# Patient Record
Sex: MALE | Race: WHITE
[De-identification: names, ages, dates, MRNs, and addresses within clinical notes are randomized per-mention and may not be internally consistent; named-entity substitution may affect disease eponyms.]

---

## 2020-07-15 ENCOUNTER — HOSPITAL ENCOUNTER (EMERGENCY)
Dept: HOSPITAL 74 - JER | Age: 25
LOS: 1 days | Discharge: HOME | End: 2020-07-16
Payer: COMMERCIAL

## 2020-07-15 VITALS — BODY MASS INDEX: 28.6 KG/M2

## 2020-07-15 DIAGNOSIS — R10.9: Primary | ICD-10-CM

## 2020-07-15 DIAGNOSIS — N50.812: ICD-10-CM

## 2020-07-15 LAB
ALBUMIN SERPL-MCNC: 4.6 G/DL (ref 3.4–5)
ALP SERPL-CCNC: 93 U/L (ref 45–117)
ALT SERPL-CCNC: 35 U/L (ref 13–61)
ANION GAP SERPL CALC-SCNC: 7 MMOL/L (ref 8–16)
APPEARANCE UR: CLEAR
AST SERPL-CCNC: 26 U/L (ref 15–37)
BASOPHILS # BLD: 0.4 % (ref 0–2)
BILIRUB SERPL-MCNC: 0.3 MG/DL (ref 0.2–1)
BILIRUB UR STRIP.AUTO-MCNC: NEGATIVE MG/DL
BUN SERPL-MCNC: 20.9 MG/DL (ref 7–18)
CALCIUM SERPL-MCNC: 9.2 MG/DL (ref 8.5–10.1)
CHLORIDE SERPL-SCNC: 108 MMOL/L (ref 98–107)
CO2 SERPL-SCNC: 25 MMOL/L (ref 21–32)
COLOR UR: YELLOW
CREAT SERPL-MCNC: 0.9 MG/DL (ref 0.55–1.3)
DEPRECATED RDW RBC AUTO: 13.3 % (ref 11.9–15.9)
EOSINOPHIL # BLD: 2.4 % (ref 0–4.5)
GLUCOSE SERPL-MCNC: 94 MG/DL (ref 74–106)
HCT VFR BLD CALC: 46.8 % (ref 35.4–49)
HGB BLD-MCNC: 15.8 GM/DL (ref 11.7–16.9)
KETONES UR QL STRIP: NEGATIVE
LEUKOCYTE ESTERASE UR QL STRIP.AUTO: NEGATIVE
LIPASE SERPL-CCNC: 161 U/L (ref 73–393)
LYMPHOCYTES # BLD: 38.3 % (ref 8–40)
MCH RBC QN AUTO: 29.6 PG (ref 25.7–33.7)
MCHC RBC AUTO-ENTMCNC: 33.7 G/DL (ref 32–35.9)
MCV RBC: 87.6 FL (ref 80–96)
MONOCYTES # BLD AUTO: 7.2 % (ref 3.8–10.2)
NEUTROPHILS # BLD: 51.7 % (ref 42.8–82.8)
NITRITE UR QL STRIP: NEGATIVE
PH UR: 6.5 [PH] (ref 5–8)
PLATELET # BLD AUTO: 216 K/MM3 (ref 134–434)
PMV BLD: 8.5 FL (ref 7.5–11.1)
POTASSIUM SERPLBLD-SCNC: 4.2 MMOL/L (ref 3.5–5.1)
PROT SERPL-MCNC: 7.7 G/DL (ref 6.4–8.2)
PROT UR QL STRIP: NEGATIVE
PROT UR QL STRIP: NEGATIVE
RBC # BLD AUTO: 5.35 M/MM3 (ref 4–5.6)
SODIUM SERPL-SCNC: 140 MMOL/L (ref 136–145)
SP GR UR: 1.03 (ref 1.01–1.03)
UROBILINOGEN UR STRIP-MCNC: 1 MG/DL (ref 0.2–1)
WBC # BLD AUTO: 10.5 K/MM3 (ref 4–10)

## 2020-07-15 PROCEDURE — 3E033GC INTRODUCTION OF OTHER THERAPEUTIC SUBSTANCE INTO PERIPHERAL VEIN, PERCUTANEOUS APPROACH: ICD-10-PCS

## 2020-07-15 PROCEDURE — 3E0333Z INTRODUCTION OF ANTI-INFLAMMATORY INTO PERIPHERAL VEIN, PERCUTANEOUS APPROACH: ICD-10-PCS

## 2020-07-15 NOTE — PDOC
History of Present Illness





- General


Chief Complaint: Pain, Acute


Stated Complaint: STOMACH PAIN


Time Seen by Provider: 07/15/20 20:18


History Source: Patient


Exam Limitations: No Limitations





- History of Present Illness


Initial Comments: 





07/15/20 20:59


25M w/o PMH p/w moderate sharp constant left flank and LLQ pain with associated 

left testicular pain x1 day. no f/c, n/v, JILLIAN. has had similar episodes in the 

past w/o definitive dx. denies cough, sob. +pleuritic cp only on deep 

inspiration. sexually active in monogamous relationship. NKDA. Denies etoh, 

tobacco, substances. 








Past History





- Medical History


Allergies/Adverse Reactions: 


                                    Allergies











Allergy/AdvReac Type Severity Reaction Status Date / Time


 


No Known Allergies Allergy   Verified 07/15/20 20:24











Home Medications: 


Ambulatory Orders





Acetaminophen [Tylenol .Extra-Strength -] 500 mg PO AM 11/02/15 


Ibuprofen [Motrin -] 600 mg PO QID #28 tablet 11/02/15 


Cyclobenzaprine HCl [Flexeril 10 mg] 10 mg PO Q8H PRN #15 tablet 10/11/17 


Ibuprofen 600 mg PO Q6H PRN #20 tablet 10/11/17 











- Immunization History


Immunization Up to Date: No





- Psycho-Social/Smoking History


Smoking History: Never smoked


Have you smoked in the past 12 months: No





- Substance Abuse Hx (Audit-C & DAST Scrn)


How often the patient has a drink containing alcohol: Never


Score: In Men: 4 or > Positive; In Women: 3 or > Positive: 0


Screen Result (Pos requires Nsg. Audit-10AR): Negative





**Review of Systems





- Review of Systems


Comments:: 





07/16/20 06:05


CONSTITUTIONAL: Denies F / C


HEENT: Denies headache, lightheadedness, dizziness, changes in vision / hearing,

diplopia, blurry vision, sore throat, rhinorrhea


RESP: Denies SOB, cough, orthopnea, CARRION


CARD: +pleuritic / deep inspiratory chest pain


GI: + left flank and LLQ pain. Denies N / V / D, bloody stool, inability to 

tolerate PO


: + left testicular pain, +dysuria. 


NEURO: Denies numbness, tingling, weakness


MSK: Denies back pain


SKIN: Denies rashes











*Physical Exam





- Vital Signs


                                Last Vital Signs











Temp Pulse Resp BP Pulse Ox


 


 98.7 F   88   20   129/83   98 


 


 07/15/20 20:19  07/15/20 20:19  07/15/20 20:19  07/15/20 20:19  07/15/20 20:19














- Physical Exam





07/16/20 06:05


GEN: Well appearing, NAD, comfortable. AAOx3.


HEENT: NC/AT, EOMI, PERRL. No facial asymmetry. Moist mucous membranes. Normal 

voice. Supple neck w/ FROM.


CV: +reproducible TTP left pectoralis. S1/S2, RRR, no m/r/g


LUNG: CTAB, no wheezes, crackles, rales, rhonchi. 


GI: +L CVAT. + LLQ. Soft, nondistented, +BS, no guarding, no rebound. 


: +TTP of the left scrotum / testis; no horizontal lay. No scrotal or penile 

swelling. No erythema. No hernias or testicular masses palpated. Patient is not 

circumcised. No bleeding or discharge at meatus. 


MSK: No LE edema. No obvious deformities of all extremities. 


SKIN: Warm, dry, no rashes appreciated.


PSYCH: Normal mood and affect.


NEURO: Moving all extremities well. ambulates w/ normal gait. 

















ED Treatment Course





- LABORATORY


CBC & Chemistry Diagram: 


                                 07/15/20 21:52





                                 07/15/20 21:52





Medical Decision Making





- Medical Decision Making





07/16/20 06:05


25M w/ LLQ, L flank, and L testicular pain since 2pm today. 


DDX - stone, UTI, Gn/Ch, epididmitis, lower concern for testicular torsion. 


spiral CT 


scrotal/testicular US


toradol 


fluids


labs


urine 





07/15/20 23:46


patient has symptomatic relief s/p meds


labs reviewed 


no acute pathology on CT 


IOC US REPORT: 


EXAM: SCROTUM AND CONTENTS US


HISTORY: Left testicular pain


COMPARISON: None.


FINDINGS:


The testicles are normal in size and echogenicity.


Symmetric flow to both testicles on color Doppler evaluation with normal sowmya

rial and venous waveforms bilaterally


No intra-or extratesticular masses


The right and left epididymis are normal in size with homogeneous echotexture


No hydroceles or varicoceles


THIS DOCUMENT HAS BEEN ELECTRONICALLY SIGNED


Flaco Santamaria MD


   07/15/2020 23:37 EST





07/16/20 00:01


results, follow up, and return precautions provided to the patient


notified of one pending result


all questions and concerns addressed 


dc home





Discharge





- Discharge Information


Problems reviewed: Yes


Clinical Impression/Diagnosis: 


 Left flank pain, Testicular pain, left





Condition: Stable


Disposition: HOME





- Admission


No





- Follow up/Referral


Referrals: 


Rebecca Napier MD [Primary Care Provider] - 


Monserrat Jacobsen MD [Staff Physician] - 





- Patient Discharge Instructions


Patient Printed Discharge Instructions:  DI for Flank Pain


Additional Instructions: 


Take Tylenol as directed on the label for pain. 


Continue your home medications as prescribed.





Your ultrasound and CAT scans were reassuring; reports of both were provided to 

you. 





Follow up with your Primary Care Doctor regarding this ED visit in the next 5-7 

days.


Follow up with urology in the next 5-10 days. We referred to you to Dr. Jacobsen, 

you can call the number attached to schedule an appointment. 





Return to the nearest Emergency Department if you experience new or worsening 

symptoms





-----





[place] Tylenol hector se indica en la etiqueta para el dolor.


[place] medicamentos caseros segn lo prescrito.





Cruz ultrasonido y tomografas computarizadas fueron tranquilizadores; Se le 

proporcionaron informes de ambos.





Gaurav un seguimiento con cruz mdico de atencin primaria con respecto a esta 

visita al [place] en los prximos 5-7 harper.


Gaurav un seguimiento con urologa en los prximos 5-10 harper. Nos referimos a 

usted al Dr. Jacobsen, puede llamar al nmero adjunto para programar jonathan chemo.





Regrese al departamento de emergencias ms neda si experimenta sntomas 

nuevos o que empeoran


Print Language: Yakut





- Post Discharge Activity


Work/Back to School Note:  Back to Work

## 2020-07-15 NOTE — PDOC
Rapid Medical Evaluation


Chief Complaint: Pain, Acute


Time Seen by Provider: 07/15/20 20:18


Medical Evaluation: 


                                    Allergies











Allergy/AdvReac Type Severity Reaction Status Date / Time


 


No Known Allergies Allergy   Verified 10/10/17 18:47











07/15/20 20:20


25 year old male no pmhx presenting to the ED with LLQ pain since 2pm with 

associated nausea, no vomiting, diarrhea or constipation.  Not associated with 

food. Denies fever chill vomiting





PE: 


Chest RRR CTA 


Abdomen ttp to LLQ without rebound





Plan 


Labs 


GI cocktail





Pt to precede to ED for further eval and management at the discretion of the ED 

provider


 





**Discharge Disposition





- Referrals


Referrals: 


Rebecca Napier MD [Primary Care Provider] - 





- Patient Instructions





- Post Discharge Activity

## 2020-07-15 NOTE — PDOC
Documentation entered by Kinga Tristan SCRIBE, acting as scribe for 

Mireya Alberto DO.








Mireya Alberto DO:  This documentation has been prepared by the Abbi walter Nirvannie, SCRIBE, under my direction and personally reviewed by me in its

entirety.  I confirm that the documentation accurately reflects all work, 

treatment, procedures, and medical decision making performed by me.  





Attending Attestation





- Resident


Resident Name: Matthieu Thompson





- ED Attending Attestation


I have performed the following: I have examined & evaluated the patient, The 

case was reviewed & discussed with the resident, I agree w/resident's findings &

plan, Exceptions are as noted





- HPI


HPI: 





07/15/20 21:51


The patient is a 25 year old male with no significant past medical history who 

presents to the ED with sharp, constant left flank and LLQ abdominal pain with 

associated left-sided testicular pain. 





Allergies: NKDA


Primary Care Physician: Dr. Rebecca Napier





- Physicial Exam


PE: 





07/15/20 22:21


Constitutional: Awake, alert, oriented.  No acute distress.


Head:  Normocephalic.  Atraumatic


Eyes:  PERRL. EOMI.  Conjunctivae are not pale.


ENT:  Mucous membranes are moist and intact. Posterior pharynx without exudates 

or erythema. Uvula midline.


Neck:  Supple.  Full ROM. No lymphadenopathy.


Cardiovascular:  Regular rate.  Regular rhythm. S1, S2 regular.  Distal pulses 

are 2+ and symmetric.  


Pulmonary/Chest:  No evidence of respiratory distress.  Clear to auscultation 

bilaterally  No wheezing, rales or rhonchi.


Abdominal:  Soft and non-distended.  +Suprapubic tenderness.  No rebound, 

guarding or rigidity.  No organomegaly. No palpable masses. Good bowel sounds.


Back:  +L CVA tenderness.


Penile: Per resident Dr. Judd exam. 


Musculoskeletal:  No edema.  No cyanosis.  No clubbing.  Full range of motion in

all extremities.  No calf tenderness. Radial/pedal pulses are intact and 2+ 

bilaterally


Skin:  Skin is warm and dry.  No petechiae.  No purpura.  


Neurological:  Alert and oriented to person, place, and time.  Cranial nerves 

II-XII are grossly intact. Normal speech. Strength is grossly symmetric. No 

sensory deficits.


Psychiatric:  Good eye contact.  Normal interaction, affect and behavior.








- Medical Decision Making





07/15/20 21:54


a/p: 24yo male with L flank pain and L testicular pain


-denies f/c


-no penile discharge


-no hematuria, but c/o dysuria


-L testicular ttp per resident, ultrasound ordered


-concern for uti, renal colic, epididymitis, hydrocele, varicocele


-will send labs, ua, ucx


-gc/chl


-will monitor and reassess


-pt is nontoxic in appearance


07/15/20 23:42


no uti


labs reviewed and stable


07/15/20 23:53


normal scrotal ultrasound by [facility]


pt feeling better


ct neg for acute intraabd pathology


stable for dc to home





Discharge





- Discharge Information


Problems reviewed: Yes


Clinical Impression/Diagnosis: 


 Left flank pain, Testicular pain, left





Condition: Stable


Disposition: HOME





- Admission


No





- Follow up/Referral


Referrals: 


Rebecca Napier MD [Primary Care Provider] - 





- Patient Discharge Instructions


Additional Instructions: 


Take Tylenol as directed on the label for pain. 


Continue your home medications as prescribed.





Your ultrasound and CAT scans were reassuring; reports of both were provided to 

you. 





Follow up with your Primary Care Doctor regarding this ED visit in the next 5-7 

days.





Return to the nearest Emergency Department if you experience new or worsening 

symptoms





- Post Discharge Activity


Work/Back to School Note:  Back to Work

## 2020-07-16 VITALS — DIASTOLIC BLOOD PRESSURE: 79 MMHG | HEART RATE: 62 BPM | SYSTOLIC BLOOD PRESSURE: 128 MMHG | TEMPERATURE: 97.9 F
